# Patient Record
Sex: MALE | Race: OTHER | Employment: FULL TIME | URBAN - METROPOLITAN AREA
[De-identification: names, ages, dates, MRNs, and addresses within clinical notes are randomized per-mention and may not be internally consistent; named-entity substitution may affect disease eponyms.]

---

## 2018-02-15 ENCOUNTER — HOSPITAL ENCOUNTER (OUTPATIENT)
Dept: GENERAL RADIOLOGY | Age: 53
Discharge: HOME | End: 2018-02-15
Payer: COMMERCIAL

## 2018-02-15 ENCOUNTER — OFFICE VISIT (OUTPATIENT)
Dept: ONCOLOGY | Age: 53
End: 2018-02-15

## 2018-02-15 VITALS
HEART RATE: 86 BPM | OXYGEN SATURATION: 97 % | HEIGHT: 71 IN | SYSTOLIC BLOOD PRESSURE: 162 MMHG | TEMPERATURE: 98.5 F | WEIGHT: 210.4 LBS | DIASTOLIC BLOOD PRESSURE: 105 MMHG | BODY MASS INDEX: 29.46 KG/M2

## 2018-02-15 DIAGNOSIS — D75.89 MACROCYTOSIS WITHOUT ANEMIA: ICD-10-CM

## 2018-02-15 DIAGNOSIS — I10 ESSENTIAL HYPERTENSION: ICD-10-CM

## 2018-02-15 DIAGNOSIS — D75.1 POLYCYTHEMIA: Primary | ICD-10-CM

## 2018-02-15 DIAGNOSIS — K76.0 HEPATIC STEATOSIS: ICD-10-CM

## 2018-02-15 DIAGNOSIS — Z00.00 HEALTH CARE MAINTENANCE: ICD-10-CM

## 2018-02-15 DIAGNOSIS — E11.42 TYPE 2 DIABETES MELLITUS WITH DIABETIC POLYNEUROPATHY, WITHOUT LONG-TERM CURRENT USE OF INSULIN (HCC): ICD-10-CM

## 2018-02-15 DIAGNOSIS — D75.1 POLYCYTHEMIA: ICD-10-CM

## 2018-02-15 PROCEDURE — 71046 X-RAY EXAM CHEST 2 VIEWS: CPT

## 2018-02-15 RX ORDER — LOSARTAN POTASSIUM AND HYDROCHLOROTHIAZIDE 12.5; 5 MG/1; MG/1
TABLET ORAL
COMMUNITY
Start: 2017-06-01 | End: 2018-02-15 | Stop reason: SDUPTHER

## 2018-02-15 RX ORDER — SITAGLIPTIN AND METFORMIN HYDROCHLORIDE 100; 1000 MG/1; MG/1
TABLET, FILM COATED, EXTENDED RELEASE ORAL
Refills: 0 | COMMUNITY
Start: 2018-02-02

## 2018-02-15 RX ORDER — CHOLECALCIFEROL TAB 125 MCG (5000 UNIT) 125 MCG
TAB ORAL DAILY
COMMUNITY

## 2018-02-15 RX ORDER — BACLOFEN 20 MG
500 TABLET ORAL DAILY
COMMUNITY

## 2018-02-15 RX ORDER — METOPROLOL SUCCINATE 25 MG/1
25 TABLET, EXTENDED RELEASE ORAL DAILY
Refills: 0 | COMMUNITY
Start: 2018-01-31

## 2018-02-15 NOTE — MR AVS SNAPSHOT
[facility] 
 
 
 [facility], [address] [place] [address] 
[phone] Patient: Scar Alonso. MRN: [id number] :1965 Visit Information Date & Time Provider Department Dept. Phone Encounter #  
 2/15/2018  9:00 AM MD [facility] at [address] [place] [address] [id number] Follow-up Instructions Return in about 2 weeks (around 3/1/2018) for Polycythemia f/u, Thorn. Your Appointments 3/2/2018  8:30 AM  
ESTABLISHED PATIENT with MD [facility] at [address] [place] [address]-[facility]) Appt Note: Polycythemia f/u, Thorn. [facility], [address] [place] [address] [place] [id number]  
[phone]  
  
   
 [address] [place] [address], [address] [place] [address] Upcoming Health Maintenance Date Due DTaP/Tdap/Td series (1 - Tdap) 1988 Influenza Age 5 to Adult 2017 FOBT Q 1 YEAR AGE 50-75 2017 Allergies as of 2/15/2018  Review Complete On: 2/15/2018 By: Sara Corbin RN Severity Noted Reaction Type Reactions Augmentin [Amoxicillin-pot Clavulanate]  2014   Side Effect Nausea Only Current Immunizations  Never Reviewed No immunizations on file. Not reviewed this visit You Were Diagnosed With   
  
 Codes Comments Polycythemia    -  Primary ICD-10-CM: D75.1 ICD-9-CM: 238.4 Vitals BP Pulse Temp Height(growth percentile) Weight(growth percentile) SpO2  
 (!) 162/105 (BP 1 Location: Left arm, BP Patient Position: Sitting) 86 98.5 °F (36.9 °C) (Oral) 5' 11\" (1.803 m) 210 lb 6.4 oz (95.4 kg) 97% BMI Smoking Status 29.34 kg/m2 Never Smoker BMI and BSA Data Body Mass Index Body Surface Area  
 29.34 kg/m 2 2.19 m 2 Preferred Pharmacy Pharmacy Name Phone  [address] [place], [place] - [address] AT [address] [place] [address] [address] [place] [address] [phone] Your Updated Medication List  
  
   
This list is accurate as of: 2/15/18  9:42 AM.  Always use your most recent med list.  
  
  
  
  
 cholecalciferol (VITAMIN D3) 5,000 unit Tab tablet Commonly known as:  VITAMIN D3 Take  by mouth daily. fluticasone 50 mcg/actuation nasal spray Commonly known as:  FLONASE  
1 spray in each nostril twice daily JANUMET -1,000 mg Tm24 Generic drug:  SITagliptin-metFORMIN  
TK 1 T PO QHS  
  
 losartan-hydroCHLOROthiazide 50-12.5 mg per tablet Commonly known as:  HYZAAR  
TAKE 1 TABLET BY MOUTH ONCE DAILY  
  
 magnesium oxide 500 mg Tab Take 500 mg by mouth daily. metoprolol succinate 25 mg XL tablet Commonly known as:  TOPROL-XL Take 25 mg by mouth daily. MULTIVITAMIN PO Take  by mouth. PROTONIX 20 mg tablet Generic drug:  pantoprazole Take 20 mg by mouth two (2) times a day. Follow-up Instructions Return in about 2 weeks (around 3/1/2018) for Polycythemia f/u, Fernando. To-Do List   
 02/15/2018 Lab:  CBC WITH AUTOMATED DIFF   
  
 02/15/2018 Lab:  ERYTHROPOIETIN   
  
 02/15/2018 Lab:  METABOLIC PANEL, COMPREHENSIVE   
  
 02/15/2018 Lab:  MISC. LAB TEST   
  
 02/15/2018 Imaging:  XR CHEST PA LAT Patient Instructions Polycythemia: Care Instructions Your Care Instructions Polycythemia (say \"paw-krys-sy-THEE-randy-uh) is an abnormal increase in red blood cells. It happens when the tissue inside your bones (bone marrow) makes too much blood. It also can occur if your blood does not have enough liquid, or plasma. This can make the number of red blood cells seem higher than normal. The extra red blood cells make your blood thicker than normal. This may raise your risk for blood clots that can cause heart attacks or strokes.  Clots can form in the deep veins of the body, a condition called deep vein thrombosis. Or, a clot can travel through the blood to a lung (a pulmonary embolism). Your doctor may treat you by taking out some of your blood (phlebotomy). The process is like donating blood. Your doctor may even recommend that you donate blood. You may take pills to stop your body from making red blood cells. You also will get treatment for any other conditions that may cause your body to make too many red blood cells. Follow-up care is a key part of your treatment and safety. Be sure to make and go to all appointments, and call your doctor if you are having problems. It's also a good idea to know your test results and keep a list of the medicines you take. How can you care for yourself at home? · Be safe with medicines. Take your medicines exactly as prescribed. Call your doctor if you think you are having a problem with your medicine. · Drink plenty of fluids, enough so that your urine is light yellow or clear like water, before and after you have blood removed. If you have kidney, heart, or liver disease and have to limit fluids, talk with your doctor before you increase the amount of fluids you drink. · Take it easy after you have had blood removed. Do not do vigorous exercise. · If your doctor recommends aspirin, take it exactly as prescribed. Call your doctor if you think you are having a problem with your medicine. · Do not smoke. Smoking increases the risk of blood clots and may reduce the amount of oxygen in your blood. If you need help quitting, talk to your doctor about stop-smoking programs and medicines. These can increase your chances of quitting for good. · Take an antihistamine, such as a nondrowsy one like loratadine (Claritin) or one that might make you sleepy like diphenhydramine (Benadryl), if your skin is itchy. Some people who have this condition have itching. · Wear medical alert jewelry that lists your clotting problem.  You can buy this at most [facility]. When should you call for help? Call 911 anytime you think you may need emergency care. For example, call if: 
? · You have sudden chest pain and shortness of breath, or you cough up blood. ? · You have symptoms of a stroke. These may include: 
¨ Sudden numbness, tingling, weakness, or loss of movement in your face, arm, or leg, especially on only one side of your body. ¨ Sudden vision changes. ¨ Sudden trouble speaking. ¨ Sudden confusion or trouble understanding simple statements. ¨ Sudden problems with walking or balance. ¨ A sudden, severe headache that is different from past headaches. ? · You have symptoms of a heart attack. These may include: ¨ Chest pain or pressure, or a strange feeling in the chest. 
¨ Sweating. ¨ Shortness of breath. ¨ Nausea or vomiting. ¨ Pain, pressure, or a strange feeling in the back, neck, jaw, or upper belly or in one or both shoulders or arms. ¨ Lightheadedness or sudden weakness. ¨ A fast or irregular heartbeat. After you call 911, the  may tell you to chew 1 adult-strength or 2 to 4 low-dose aspirin. Wait for an ambulance. Do not try to drive yourself. ?Call your doctor now or seek immediate medical care if: 
? · You have signs of a blood clot, such as: 
¨ Pain in your calf, back of knee, thigh, or groin. ¨ Redness and swelling in your leg or groin. ? Watch closely for changes in your health, and be sure to contact your doctor if you have any problems. Where can you learn more? Go to http://yury-teofilo.info/. Enter X437 in the search box to learn more about \"Polycythemia: Care Instructions. \" Current as of: October 13, 2016 Content Version: 11.4 © 7928-9644 [employer]. Care instructions adapted under license by [employer] (which disclaims liability or warranty for this information).  If you have questions about a medical condition or this instruction, always ask your healthcare professional. [address] any warranty or liability for your use of this information. Introducing [facility]! Dear Joo Cox: Thank you for requesting a Seakeeper account. Our records indicate that you already have an active Seakeeper account. You can access your account anytime at [email or web address] Did you know that you can access your hospital and ER discharge instructions at any time in Seakeeper? You can also review all of your test results from your hospital stay or ER visit. Additional Information If you have questions, please visit the Frequently Asked Questions section of the Seakeeper website at [email or web address] Remember, Seakeeper is NOT to be used for urgent needs. For medical emergencies, dial 911. Now available from your iPhone and Android! Please provide this summary of care documentation to your next provider. Your primary care clinician is listed as Dary Linda. If you have any questions after today's visit, please call [phone].

## 2018-02-15 NOTE — LETTER
2/15/2018 10:03 AM 
 
Patient:  Ankush Weldon. YOB: 1965 Date of Visit: 2/15/2018 Dione Plascencia MD 
[address] [place] [address] VIA Facsimile: [phone] Dear Dione Plascencia MD, Thank you for referring Mr. Dione Plascencia to [address] [place] [address] for evaluation and treatment. Below are the relevant portions of my assessment and plan of care. Thank you very much for your referral of Mr. Dione Plascencia. If you have questions, please do not hesitate to call me. I look forward to following Mr. Brantley along with you and will keep you updated as to his progress. Sincerely, Faraz Whitehead MD

## 2018-02-15 NOTE — PROGRESS NOTES
[address] [facility] at [facility]  [phone]    Hematology / Oncology Consult    Reason for Visit:   York Mitch. is a 50 y.o. male who is seen in consultation at the request of Dr. Casey Kenyon for evaluation of polycythemia. History of Present Illness:   Mr. Amanda Henson is a 51 y/o male referred by Dr. Concepción Seals for elevated Hgb of 18. He c/o night sweats and fatigue to his PCP. He has HTN, DMII, Nephrolithiasis, Esophageal polyps, GERD, Peripheral neuropathy, fatty liver based on u/s in 2014. He sees Dr. Ady Estrada in GI. He had EGDs done in 2001, 2015, 2018. Review of labs here in [facility] system shows Hgb range from 15-18 since at [address] [place] [address] 2009. He endorses pruritus after showering. He endorses facial flushing and drenching night sweats on and off for a year. Sometimes he has LLQ pain. He states he has some symptoms of ALMA DELIA and will undergo testing soon by his PCP. He states he has recently gained 25 lbs, and he has made dietary changes already. His goal weight is 180. No fevers, infections per patient. He is a non-smoker. He does take a diuretic and has been on this medication for the past 5 yrs or more. Of note, patient was evaluated in our HemOnc clinic by Dr. Loy Oswald in 2014 for high ferritin, possible iron overload. Testing at that time showed elevated ferritin 711, but normal iron saturation. Gene testing was negative for hemochromatosis. It appears that patient did not follow up afterwards.    -Labs 2/1/18: Testosterone 336, WBC 6.5, Hgb 18.8, .5, RDW 14.0, . Past Medical History:   Diagnosis Date    Diabetes ([address]     Hypertension       History reviewed. No pertinent surgical history. Social History   Substance Use Topics    Smoking status: Never Smoker    Smokeless tobacco: Never Used    Alcohol use 0.0 oz/week     8 - 10 Standard drinks or equivalent per week      Comment: 15 drinks weekly (beer and wine)    Works as an  with [employer]. . Family History   Problem Relation Age of Onset    Diabetes Mother     Lung Disease Father     Diabetes Sister    Mother had several strokes. Current Outpatient Prescriptions   Medication Sig    magnesium oxide 500 mg tab Take  by mouth.  cholecalciferol, VITAMIN D3, (VITAMIN D3) 5,000 unit tab tablet Take  by mouth daily.  metoprolol succinate (TOPROL-XL) 25 mg XL tablet     JANUMET -1,000 mg TM24 TK 1 T PO QHS    losartan-hydrochlorothiazide (HYZAAR) 50-12.5 mg per tablet TAKE 1 TABLET BY MOUTH ONCE DAILY    MULTIVITAMIN PO Take  by mouth.  pantoprazole (PROTONIX) 20 mg tablet Take 20 mg by mouth two (2) times a day.  fluticasone (FLONASE) 50 mcg/actuation nasal spray 1 spray in each nostril twice daily     No current facility-administered medications for this visit. Allergies   Allergen Reactions    Augmentin [Amoxicillin-Pot Clavulanate] Nausea Only        Review of Systems: A complete review of systems was obtained, negative except as described above. Physical Exam:     Visit Vitals    BP (!) 162/105 (BP 1 Location: Left arm, BP Patient Position: Sitting)    Pulse 86    Temp 98.5 °F (36.9 °C) (Oral)    Ht 5' 11\" (1.803 m)    Wt 210 lb 6.4 oz (95.4 kg)    SpO2 97%    BMI 29.34 kg/m2     ECOG PS: 0  General: Well developed, no acute distress  Eyes: PERRLA, EOMI, anicteric sclerae  HENT: Atraumatic, OP clear, TMs intact without erythema  Neck: Supple  Lymphatic: No cervical, supraclavicular, axillary or inguinal adenopathy  Respiratory: CTAB, normal respiratory effort  CV: Normal rate, regular rhythm, no murmurs, no peripheral edema  GI: Soft, nontender, nondistended, no masses, no hepatomegaly, no splenomegaly  MS: Normal gait and station. Digits without clubbing or cyanosis. Skin: No rashes, ecchymoses, or petechiae. Normal temperature, turgor, and texture. No obvious bob complexion. Neuro/Psych: Alert, oriented. 5/5 strength in all 4 extremities. Appropriate affect, normal judgment/insight. Results:     Lab Results   Component Value Date/Time    WBC 7.2 04/08/2016 11:25 PM    HGB 16.4 04/08/2016 11:25 PM    HCT 46.3 04/08/2016 11:25 PM    PLATELET 046 41/69/5883 11:25 PM    MCV 93.9 04/08/2016 11:25 PM    ABS. NEUTROPHILS 3.7 04/08/2016 11:25 PM    Hemoglobin (POC) 17.7 (H) 05/24/2011 11:20 PM    Hematocrit (POC) 52 (H) 05/24/2011 11:20 PM     Lab Results   Component Value Date/Time    Sodium 136 04/08/2016 11:25 PM    Potassium 3.6 04/08/2016 11:25 PM    Chloride 99 04/08/2016 11:25 PM    CO2 29 04/08/2016 11:25 PM    Glucose 107 (H) 04/08/2016 11:25 PM    BUN 13 04/08/2016 11:25 PM    Creatinine 0.94 04/08/2016 11:25 PM    GFR est AA >60 04/08/2016 11:25 PM    GFR est non-AA >60 04/08/2016 11:25 PM    Calcium 9.7 04/08/2016 11:25 PM    Sodium (POC) 139 05/24/2011 11:20 PM    Potassium (POC) 3.7 05/24/2011 11:20 PM    Chloride (POC) 105 05/24/2011 11:20 PM    Glucose (POC) 136 (H) 05/24/2011 11:20 PM    BUN (POC) 7 (L) 05/24/2011 11:20 PM    Creatinine (POC) 1.4 (H) 05/24/2011 11:20 PM    Calcium, ionized (POC) 1.07 (L) 05/24/2011 11:20 PM     Lab Results   Component Value Date/Time    Bilirubin, total 1.0 04/08/2016 11:25 PM    ALT (SGPT) 191 (H) 04/08/2016 11:25 PM    AST (SGOT) 59 (H) 04/08/2016 11:25 PM    Alk. phosphatase 43 (L) 04/08/2016 11:25 PM    Protein, total 7.1 04/08/2016 11:25 PM    Albumin 4.2 04/08/2016 11:25 PM    Globulin 2.9 04/08/2016 11:25 PM     Lab Results   Component Value Date/Time    Iron 127 06/09/2014 02:05 PM    TIBC 333 06/09/2014 02:05 PM    Iron % saturation 38 06/09/2014 02:05 PM    Ferritin 711 (H) 04/28/2014 10:46 AM           Imaging:     None recent. Assessment & Plan:   Marcin Christina is a 50 y.o. male comes in for evaluation and management of polycythemia. 1. Polcythemia: Will need to determine whether this is primary or secondary. No obvious lung disease, but he may have sleep apnea. Nonsmoker.  Given his Hgb has been elevated for several years on an off and well as symptoms of sweats and pruritus, I am suspicious of a Primary Polycythemia Vera. Will check blood for the gene mutations found in this disease. I did discuss that Polycythemia causes an increased risk of thrombosis. For now, I have asked him to take his Aspirin 81mg daily and drink at least 1 liter or more of water daily. I did discuss that he may need phlebotomy, but I would like to determine his diagnosis first.  -- Check EPO, JAK2 with reflex testing, CXR  -- Continue ASA 81mg PO daily  -- Drink >= 1 liter water daily  -- Return in 2 weeks for f/u    2. Macrocytosis: Possibly 2/2 alcohol intake. Will continue to monitor. 3. Type II DM: On Janumet. Recent HgbA1c was 7.3 in Jan 2018 down from 10.5. Managed by PCP. 4. Fatty liver: Likely 2/2 diabetes. I did discuss moderation of alcohol with patient given the steatosis as well as general guideline recommendations. 5. HTN: Uncontrolled today as he has not taken his BP meds this morning. Normally better controlled on HCTZ-Lisinopril and Metoprolol. 6. Health maintenance: Due for colonoscopy this year if not done already. I appreciate the opportunity to participate in Mr. Freddy Mora Jr.'s care.     Signed By: Kathleen Contreras MD     February 15, 2018

## 2018-02-15 NOTE — PATIENT INSTRUCTIONS
Polycythemia: Care Instructions  Your Care Instructions    Polycythemia (say \"paw-krys-sy-THEE-randy-uh) is an abnormal increase in red blood cells. It happens when the tissue inside your bones (bone marrow) makes too much blood. It also can occur if your blood does not have enough liquid, or plasma. This can make the number of red blood cells seem higher than normal. The extra red blood cells make your blood thicker than normal. This may raise your risk for blood clots that can cause heart attacks or strokes. Clots can form in the deep veins of the body, a condition called deep vein thrombosis. Or, a clot can travel through the blood to a lung (a pulmonary embolism). Your doctor may treat you by taking out some of your blood (phlebotomy). The process is like donating blood. Your doctor may even recommend that you donate blood. You may take pills to stop your body from making red blood cells. You also will get treatment for any other conditions that may cause your body to make too many red blood cells. Follow-up care is a key part of your treatment and safety. Be sure to make and go to all appointments, and call your doctor if you are having problems. It's also a good idea to know your test results and keep a list of the medicines you take. How can you care for yourself at home? · Be safe with medicines. Take your medicines exactly as prescribed. Call your doctor if you think you are having a problem with your medicine. · Drink plenty of fluids, enough so that your urine is light yellow or clear like water, before and after you have blood removed. If you have kidney, heart, or liver disease and have to limit fluids, talk with your doctor before you increase the amount of fluids you drink. · Take it easy after you have had blood removed. Do not do vigorous exercise. · If your doctor recommends aspirin, take it exactly as prescribed.  Call your doctor if you think you are having a problem with your medicine. · Do not smoke. Smoking increases the risk of blood clots and may reduce the amount of oxygen in your blood. If you need help quitting, talk to your doctor about stop-smoking programs and medicines. These can increase your chances of quitting for good. · Take an antihistamine, such as a nondrowsy one like loratadine (Claritin) or one that might make you sleepy like diphenhydramine (Benadryl), if your skin is itchy. Some people who have this condition have itching. · Wear medical alert jewelry that lists your clotting problem. You can buy this at most drugstores. When should you call for help? Call 911 anytime you think you may need emergency care. For example, call if:  ? · You have sudden chest pain and shortness of breath, or you cough up blood. ? · You have symptoms of a stroke. These may include:  ¨ Sudden numbness, tingling, weakness, or loss of movement in your face, arm, or leg, especially on only one side of your body. ¨ Sudden vision changes. ¨ Sudden trouble speaking. ¨ Sudden confusion or trouble understanding simple statements. ¨ Sudden problems with walking or balance. ¨ A sudden, severe headache that is different from past headaches. ? · You have symptoms of a heart attack. These may include:  ¨ Chest pain or pressure, or a strange feeling in the chest.  ¨ Sweating. ¨ Shortness of breath. ¨ Nausea or vomiting. ¨ Pain, pressure, or a strange feeling in the back, neck, jaw, or upper belly or in one or both shoulders or arms. ¨ Lightheadedness or sudden weakness. ¨ A fast or irregular heartbeat. After you call 911, the  may tell you to chew 1 adult-strength or 2 to 4 low-dose aspirin. Wait for an ambulance. Do not try to drive yourself. ?Call your doctor now or seek immediate medical care if:  ? · You have signs of a blood clot, such as:  ¨ Pain in your calf, back of knee, thigh, or groin. ¨ Redness and swelling in your leg or groin. ? Watch closely for changes in your health, and be sure to contact your doctor if you have any problems. Where can you learn more? Go to http://yury-teofilo.info/. Enter P725 in the search box to learn more about \"Polycythemia: Care Instructions. \"  Current as of: October 13, 2016  Content Version: 11.4  © 1513-0866 [employer]. Care instructions adapted under license by [employer] (which disclaims liability or warranty for this information). If you have questions about a medical condition or this instruction, always ask your healthcare professional. [address] any warranty or liability for your use of this information.

## 2018-02-22 LAB
ALBUMIN SERPL-MCNC: 4.4 G/DL (ref 3.5–5.5)
ALBUMIN/GLOB SERPL: 1.7 {RATIO} (ref 1.2–2.2)
ALP SERPL-CCNC: 50 IU/L (ref 39–117)
ALT SERPL-CCNC: 86 IU/L (ref 0–44)
AST SERPL-CCNC: 54 IU/L (ref 0–40)
BACKGROUND, 081113: NORMAL
BACKGROUND, 489163: NORMAL
BACKGROUND: 480503: NORMAL
BASOPHILS # BLD AUTO: 0.1 X10E3/UL (ref 0–0.2)
BASOPHILS NFR BLD AUTO: 1 %
BILIRUB SERPL-MCNC: 0.8 MG/DL (ref 0–1.2)
BUN SERPL-MCNC: 15 MG/DL (ref 6–24)
BUN/CREAT SERPL: 17 (ref 9–20)
CALCIUM SERPL-MCNC: 9.8 MG/DL (ref 8.7–10.2)
CALR MUTATION DETECTION RESULT, 489451: NORMAL
CHLORIDE SERPL-SCNC: 94 MMOL/L (ref 96–106)
CO2 SERPL-SCNC: 26 MMOL/L (ref 18–29)
CREAT SERPL-MCNC: 0.87 MG/DL (ref 0.76–1.27)
EOSINOPHIL # BLD AUTO: 0.7 X10E3/UL (ref 0–0.4)
EOSINOPHIL NFR BLD AUTO: 8 %
EPO SERPL-ACNC: 5.9 MIU/ML (ref 2.6–18.5)
ERYTHROCYTE [DISTWIDTH] IN BLOOD BY AUTOMATED COUNT: 13.4 % (ref 12.3–15.4)
EXTRACTION: NORMAL
GFR SERPLBLD CREATININE-BSD FMLA CKD-EPI: 101 ML/MIN/{1.73_M2}
GFR SERPLBLD CREATININE-BSD FMLA CKD-EPI: 116 ML/MIN/{1.73_M2}
GLOBULIN SER CALC-MCNC: 2.6 G/L (ref 1.5–4.5)
GLUCOSE SERPL-MCNC: 176 MG/DL (ref 65–99)
HCT VFR BLD AUTO: 52.4 % (ref 37.5–51)
HGB BLD-MCNC: 17.6 G/DL (ref 13–17.7)
IMM GRANULOCYTES # BLD: 0 X10E3/UL (ref 0–0.1)
IMM GRANULOCYTES NFR BLD: 0 %
INDICATIONS, 489415: NORMAL
JAK2 GENE MUT ANL BLD/T: NORMAL
JAK2 P.V617F BLD/T QL: NORMAL
LAB DIRECTOR NAME PROVIDER: NORMAL
LYMPHOCYTES # BLD AUTO: 2 X10E3/UL (ref 0.7–3.1)
LYMPHOCYTES NFR BLD AUTO: 22 %
Lab: NORMAL
MCH RBC QN AUTO: 33.4 PG (ref 26.6–33)
MCHC RBC AUTO-ENTMCNC: 33.6 G/DL (ref 31.5–35.7)
MCV RBC AUTO: 99 FL (ref 79–97)
MONOCYTES # BLD AUTO: 0.8 X10E3/UL (ref 0.1–0.9)
MONOCYTES NFR BLD AUTO: 9 %
MPL GENE MUT TESTED BLD/T: NORMAL
MPL P.W515L+W515K+S505N BLD/T QL: NORMAL
NEUTROPHILS # BLD AUTO: 5.5 X10E3/UL (ref 1.4–7)
NEUTROPHILS NFR BLD AUTO: 60 %
PLATELET # BLD AUTO: 187 X10E3/UL (ref 150–379)
POTASSIUM SERPL-SCNC: 5 MMOL/L (ref 3.5–5.2)
PROT SERPL-MCNC: 7 G/DL (ref 6–8.5)
RBC # BLD AUTO: 5.27 X10E6/UL (ref 4.14–5.8)
REASON FOR REFERRAL (NARRATIVE): NORMAL
REF LAB TEST METHOD: NORMAL
REFERENCES, 150293: NORMAL
REFERENCES, 150293: NORMAL
REFERENCES: NORMAL
REFERENCES: NORMAL
SERVICE CMNT-IMP: NORMAL
SODIUM SERPL-SCNC: 137 MMOL/L (ref 134–144)
SPECIMEN SOURCE: NORMAL
SPECIMEN SOURCE: NORMAL
WBC # BLD AUTO: 9 X10E3/UL (ref 3.4–10.8)

## 2018-03-02 ENCOUNTER — OFFICE VISIT (OUTPATIENT)
Dept: ONCOLOGY | Age: 53
End: 2018-03-02

## 2018-03-02 VITALS
SYSTOLIC BLOOD PRESSURE: 164 MMHG | HEART RATE: 74 BPM | HEIGHT: 71 IN | OXYGEN SATURATION: 97 % | WEIGHT: 212.6 LBS | TEMPERATURE: 96.9 F | DIASTOLIC BLOOD PRESSURE: 106 MMHG | BODY MASS INDEX: 29.76 KG/M2

## 2018-03-02 DIAGNOSIS — D75.1 SECONDARY POLYCYTHEMIA: Primary | ICD-10-CM

## 2018-03-02 DIAGNOSIS — E83.118 SECONDARY HEMOCHROMATOSIS: ICD-10-CM

## 2018-03-02 NOTE — PROGRESS NOTES
[id number] [facility] Oncology at [address] [facility]  [phone]    Hematology / Oncology Follow Up    Reason for Visit:   Cass Wilder is a 50 y.o. male who is seen in consultation at the request of Dr. Deanna Camargo for evaluation of polycythemia. History of Present Illness:   Mr. Lino Dunham is a 49 y/o male referred by Dr. Madelyn Tatum for elevated Hgb of 18. He c/o night sweats and fatigue to his PCP. He has HTN, DMII, Nephrolithiasis, Esophageal polyps, GERD, Peripheral neuropathy, fatty liver based on u/s in 2014. He sees Dr. Dean Gallegos in GI. He had EGDs done in 2001, 2015, 2018. Review of labs here in [facility] system shows Hgb range from 15-18 since at [address] [place] [address] 2009. He endorses pruritus after showering. He endorses facial flushing and drenching night sweats on and off for a year. Sometimes he has LLQ pain. He states he has some symptoms of ALMA DELIA and will undergo testing soon by his PCP. He states he has recently gained 25 lbs, and he has made dietary changes already. His goal weight is 180. No fevers, infections per patient. He is a non-smoker. He does take a diuretic and has been on this medication for the past 5 yrs or more. Of note, patient was evaluated in our HemOnc clinic by Dr. Poli Saravia in 2014 for high ferritin, possible iron overload. Testing at that time showed elevated ferritin 711, but normal iron saturation. Gene testing was negative for hemochromatosis. It appears that patient did not follow up afterwards.    -Labs 2/1/18: Testosterone 336, WBC 6.5, Hgb 18.8, .5, RDW 14.0, . Today, patient comes in for follow up of recent labwork. He has a slight HA, but states this is not different from his usual HA. Past Medical History:   Diagnosis Date    Anxiety     Diabetes ([address]     GERD (gastroesophageal reflux disease)     Hypertension       History reviewed. No pertinent surgical history.    Social History   Substance Use Topics    Smoking status: Never Smoker    Smokeless tobacco: Never Used    Alcohol use 0.0 oz/week     8 - 10 Standard drinks or equivalent per week      Comment: 15 drinks weekly (beer and wine)    Works as an  with [employer]. . Family History   Problem Relation Age of Onset    Lung Disease Father      emphysema    Diabetes Mother     Stroke Mother     Diabetes Sister    Mother had several strokes. Current Outpatient Prescriptions   Medication Sig    metoprolol succinate (TOPROL-XL) 25 mg XL tablet Take 25 mg by mouth daily.  JANUMET -1,000 mg TM24 TK 1 T PO QHS    magnesium oxide 500 mg tab Take 500 mg by mouth daily.  cholecalciferol, VITAMIN D3, (VITAMIN D3) 5,000 unit tab tablet Take  by mouth daily.  losartan-hydrochlorothiazide (HYZAAR) 50-12.5 mg per tablet TAKE 1 TABLET BY MOUTH ONCE DAILY    MULTIVITAMIN PO Take  by mouth.  pantoprazole (PROTONIX) 20 mg tablet Take 20 mg by mouth two (2) times a day.  fluticasone (FLONASE) 50 mcg/actuation nasal spray 1 spray in each nostril twice daily     No current facility-administered medications for this visit. Allergies   Allergen Reactions    Augmentin [Amoxicillin-Pot Clavulanate] Nausea Only        Review of Systems: A complete review of systems was obtained, negative except as described above.     Physical Exam:     Visit Vitals    BP (!) 164/106 (BP 1 Location: Left arm, BP Patient Position: Sitting)    Pulse 74    Temp 96.9 °F (36.1 °C) (Temporal)    Ht 5' 11\" (1.803 m)    Wt 212 lb 9.6 oz (96.4 kg)    SpO2 97%    BMI 29.65 kg/m2     ECOG PS: 0  General: Well developed, no acute distress  Eyes: PERRLA, EOMI, anicteric sclerae  HENT: Atraumatic, OP clear, TMs intact without erythema  Neck: Supple  Lymphatic: No cervical, supraclavicular, axillary or inguinal adenopathy  Respiratory: CTAB, normal respiratory effort  CV: Normal rate, regular rhythm, no murmurs, no peripheral edema  GI: Soft, nontender, nondistended, no masses, no hepatomegaly, no splenomegaly  MS: Normal gait and station. Digits without clubbing or cyanosis. Skin: No rashes, ecchymoses, or petechiae. Normal temperature, turgor, and texture. No obvious bob complexion. Neuro/Psych: Alert, oriented. 5/5 strength in all 4 extremities. Appropriate affect, normal judgment/insight. Results:     Lab Results   Component Value Date/Time    WBC 9.0 02/15/2018 10:46 AM    HGB 17.6 02/15/2018 10:46 AM    HCT 52.4 (H) 02/15/2018 10:46 AM    PLATELET 619 34/67/6238 10:46 AM    MCV 99 (H) 02/15/2018 10:46 AM    ABS. NEUTROPHILS 5.5 02/15/2018 10:46 AM    Hemoglobin (POC) 17.7 (H) 05/24/2011 11:20 PM    Hematocrit (POC) 52 (H) 05/24/2011 11:20 PM     Lab Results   Component Value Date/Time    Sodium 137 02/15/2018 10:46 AM    Potassium 5.0 02/15/2018 10:46 AM    Chloride 94 (L) 02/15/2018 10:46 AM    CO2 26 02/15/2018 10:46 AM    Glucose 176 (H) 02/15/2018 10:46 AM    BUN 15 02/15/2018 10:46 AM    Creatinine 0.87 02/15/2018 10:46 AM    GFR est  02/15/2018 10:46 AM    GFR est non- 02/15/2018 10:46 AM    Calcium 9.8 02/15/2018 10:46 AM    Sodium (POC) 139 05/24/2011 11:20 PM    Potassium (POC) 3.7 05/24/2011 11:20 PM    Chloride (POC) 105 05/24/2011 11:20 PM    Glucose (POC) 136 (H) 05/24/2011 11:20 PM    BUN (POC) 7 (L) 05/24/2011 11:20 PM    Creatinine (POC) 1.4 (H) 05/24/2011 11:20 PM    Calcium, ionized (POC) 1.07 (L) 05/24/2011 11:20 PM     Lab Results   Component Value Date/Time    Bilirubin, total 0.8 02/15/2018 10:46 AM    ALT (SGPT) 86 (H) 02/15/2018 10:46 AM    AST (SGOT) 54 (H) 02/15/2018 10:46 AM    Alk.  phosphatase 50 02/15/2018 10:46 AM    Protein, total 7.0 02/15/2018 10:46 AM    Albumin 4.4 02/15/2018 10:46 AM    Globulin 2.9 04/08/2016 11:25 PM     Lab Results   Component Value Date/Time    Iron 127 06/09/2014 02:05 PM    TIBC 333 06/09/2014 02:05 PM    Iron % saturation 38 06/09/2014 02:05 PM    Ferritin 711 (H) 04/28/2014 10:46 AM     EPO 5.9  BOBBY / Neeru Brown / MPL negative    Imaging:     None recent. Assessment & Plan:   Angelina Chavez. is a 50 y.o. male comes in for evaluation and management of polycythemia. 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 No obvious lung disease, but he may have sleep apnea. Nonsmoker. Given his Hgb has been elevated for several years on an off and well as symptoms of sweats and pruritus, I was suspicious of a Primary Polycythemia Vera, but testing was negative for JAK2/CALR/MPL. This all may be due to alcohol abuse causing a hemochromatosis-like picture. -- No urgent need for phlebotomy at this time. -- Continue Aspirin 81mg daily  -- Drink >= 1 liter water daily  -- Cut down on alcohol    2. Macrocytosis: Possibly 2/2 alcohol intake. Will continue to monitor. 3. Type II DM: On Janumet. Recent HgbA1c was 7.3 in Jan 2018 down from 10.5. Managed by PCP. 4. Fatty liver / transaminitis: Likely 2/2 diabetes. I did discuss moderation of alcohol with patient given the steatosis as well as general guideline recommendations. -- Refer to GI/Hepatology    5. HTN: Uncontrolled today. Normally better controlled on HCTZ-Lisinopril and Metoprolol (added in Jan 2018). -- I asked patient to follow up with PCP soon about this as he may need more medication adjustment. 6. Alcohol abuse: This can increase iron levels in the body and cause hemochromatosis-like picture. I discussed this with the patient and recommended cutting down on alcohol. The patient notified me that he started seeing someone at the [facility] for substance abuse. -- Recommend cutting down on alcohol. 6. Health maintenance: Due for colonoscopy this year if not done already. I appreciate the opportunity to participate in Mr. Tiara Gay Jr.'s care.     Signed By: Ludmila Harrell MD     March 2, 2018